# Patient Record
Sex: MALE | Race: BLACK OR AFRICAN AMERICAN | NOT HISPANIC OR LATINO | URBAN - METROPOLITAN AREA
[De-identification: names, ages, dates, MRNs, and addresses within clinical notes are randomized per-mention and may not be internally consistent; named-entity substitution may affect disease eponyms.]

---

## 2023-09-16 ENCOUNTER — HOSPITAL ENCOUNTER (EMERGENCY)
Facility: HOSPITAL | Age: 68
Discharge: HOME | End: 2023-09-16
Attending: STUDENT IN AN ORGANIZED HEALTH CARE EDUCATION/TRAINING PROGRAM
Payer: MEDICARE

## 2023-09-16 VITALS
HEART RATE: 74 BPM | DIASTOLIC BLOOD PRESSURE: 75 MMHG | BODY MASS INDEX: 23.7 KG/M2 | WEIGHT: 160 LBS | HEIGHT: 69 IN | OXYGEN SATURATION: 100 % | RESPIRATION RATE: 20 BRPM | TEMPERATURE: 99 F | SYSTOLIC BLOOD PRESSURE: 161 MMHG

## 2023-09-16 DIAGNOSIS — M54.50 CHRONIC BILATERAL LOW BACK PAIN, UNSPECIFIED WHETHER SCIATICA PRESENT: Primary | ICD-10-CM

## 2023-09-16 DIAGNOSIS — G89.29 CHRONIC BILATERAL LOW BACK PAIN, UNSPECIFIED WHETHER SCIATICA PRESENT: Primary | ICD-10-CM

## 2023-09-16 LAB
BILIRUB UR QL STRIP: NEGATIVE
CLARITY UR: CLEAR
COLOR UR: YELLOW
GLUCOSE UR QL STRIP: NEGATIVE
HGB UR QL STRIP: NEGATIVE
KETONES UR QL STRIP: NEGATIVE
LEUKOCYTE ESTERASE UR QL STRIP: NEGATIVE
NITRITE UR QL STRIP: NEGATIVE
PH UR STRIP: 5 [PH] (ref 5–8)
PROT UR QL STRIP: NEGATIVE
SP GR UR STRIP: 1.02 (ref 1–1.03)
URN SPEC COLLECT METH UR: NORMAL
UROBILINOGEN UR STRIP-ACNC: NEGATIVE EU/DL

## 2023-09-16 PROCEDURE — 96372 THER/PROPH/DIAG INJ SC/IM: CPT | Performed by: STUDENT IN AN ORGANIZED HEALTH CARE EDUCATION/TRAINING PROGRAM

## 2023-09-16 PROCEDURE — 99284 EMERGENCY DEPT VISIT MOD MDM: CPT

## 2023-09-16 PROCEDURE — 81003 URINALYSIS AUTO W/O SCOPE: CPT | Performed by: STUDENT IN AN ORGANIZED HEALTH CARE EDUCATION/TRAINING PROGRAM

## 2023-09-16 PROCEDURE — 63600175 PHARM REV CODE 636 W HCPCS: Performed by: STUDENT IN AN ORGANIZED HEALTH CARE EDUCATION/TRAINING PROGRAM

## 2023-09-16 PROCEDURE — 25000003 PHARM REV CODE 250: Performed by: STUDENT IN AN ORGANIZED HEALTH CARE EDUCATION/TRAINING PROGRAM

## 2023-09-16 RX ORDER — KETOROLAC TROMETHAMINE 30 MG/ML
30 INJECTION, SOLUTION INTRAMUSCULAR; INTRAVENOUS
Status: COMPLETED | OUTPATIENT
Start: 2023-09-16 | End: 2023-09-16

## 2023-09-16 RX ORDER — ACETAMINOPHEN 500 MG
500 TABLET ORAL EVERY 6 HOURS PRN
Qty: 20 TABLET | Refills: 0 | Status: SHIPPED | OUTPATIENT
Start: 2023-09-16

## 2023-09-16 RX ORDER — ORPHENADRINE CITRATE 30 MG/ML
30 INJECTION INTRAMUSCULAR; INTRAVENOUS
Status: COMPLETED | OUTPATIENT
Start: 2023-09-16 | End: 2023-09-16

## 2023-09-16 RX ORDER — OXYCODONE AND ACETAMINOPHEN 5; 325 MG/1; MG/1
1 TABLET ORAL
Status: COMPLETED | OUTPATIENT
Start: 2023-09-16 | End: 2023-09-16

## 2023-09-16 RX ORDER — METHOCARBAMOL 500 MG/1
1000 TABLET, FILM COATED ORAL 3 TIMES DAILY
Qty: 30 TABLET | Refills: 0 | Status: SHIPPED | OUTPATIENT
Start: 2023-09-16 | End: 2023-09-21

## 2023-09-16 RX ORDER — DEXAMETHASONE SODIUM PHOSPHATE 4 MG/ML
10 INJECTION, SOLUTION INTRA-ARTICULAR; INTRALESIONAL; INTRAMUSCULAR; INTRAVENOUS; SOFT TISSUE
Status: DISCONTINUED | OUTPATIENT
Start: 2023-09-16 | End: 2023-09-16

## 2023-09-16 RX ADMIN — DEXAMETHASONE 10 MG: 2 TABLET ORAL at 05:09

## 2023-09-16 RX ADMIN — OXYCODONE HYDROCHLORIDE AND ACETAMINOPHEN 1 TABLET: 5; 325 TABLET ORAL at 05:09

## 2023-09-16 RX ADMIN — KETOROLAC TROMETHAMINE 30 MG: 30 INJECTION, SOLUTION INTRAMUSCULAR; INTRAVENOUS at 05:09

## 2023-09-16 RX ADMIN — ORPHENADRINE CITRATE 30 MG: 60 INJECTION INTRAMUSCULAR; INTRAVENOUS at 05:09

## 2023-09-16 NOTE — ED TRIAGE NOTES
Pt presents to ED c/o back pain onset x 1 month.  Denies falls, trauma, heavy lifting, urinary symptoms or any other symptoms.  Pt reports last dosage of pain medication was yesterday around 1700.  Pain 10/10.

## 2023-09-16 NOTE — ED PROVIDER NOTES
Encounter Date: 9/16/2023       History     Chief Complaint   Patient presents with    Back Pain     X a while, X a month, seen at [facility] for same, has not followed up, has appt on Monday but could not stand the pain     68-year-old male history of alpha thalassemia, chronic kidney disease, erectile dysfunction, hypertension, lumbar disc disease with radiculopathy, osteopenia vitamin-D deficiency presents with lower back pain that has been ongoing for several months.  He reports he was evaluated in emergency department approximately 2 weeks ago at the time he reports he got shots of medications and a prescription.  He reports the shots helped but symptoms recurred.  He reports having follow-up with spinal surgeons on Monday but could not take the pain today so presented to the emergency department for evaluation.  He currently rates pain 10/10 in his worsened with movement.  He states years ago he was told he had kidney stones but this pain does not seem similar to it.  Denies any dysuria or hematuria.  Denies any fevers or chills.  Denies any urinary bowel incontinence, denies any saddle anesthesia.  Denies any abnormalities with gait.  Denies any weakness or numbness.  Denies any unexplained weight loss, denies any recent or remote trauma    The history is provided by the patient.     Review of patient's allergies indicates:  No Known Allergies  Past Medical History:   Diagnosis Date    Kidney stone      History reviewed. No pertinent surgical history.  Family History   Problem Relation Age of Onset    Hypertension Father     Sudden death Mother      Social History     Tobacco Use    Smoking status: Never   Substance Use Topics    Alcohol use: No    Drug use: No     Review of Systems   Constitutional:  Negative for chills and fever.   HENT:  Negative for congestion and rhinorrhea.    Eyes:  Negative for pain.   Respiratory:  Negative for cough and shortness of breath.    Cardiovascular:  Negative for chest pain and  leg swelling.   Gastrointestinal:  Negative for abdominal pain, nausea and vomiting.   Endocrine: Negative for polyuria.   Genitourinary:  Negative for dysuria and hematuria.   Musculoskeletal:  Positive for back pain. Negative for gait problem and neck pain.   Skin:  Negative for rash.   Allergic/Immunologic: Negative for immunocompromised state.   Neurological:  Negative for weakness and headaches.       Physical Exam     Initial Vitals [09/16/23 0430]   BP Pulse Resp Temp SpO2   (!) 185/83 70 16 98.3 °F (36.8 °C) 100 %      MAP       --         Physical Exam    Nursing note and vitals reviewed.  Constitutional: He is not diaphoretic. No distress.   HENT:   Head: Normocephalic and atraumatic.   Eyes: Conjunctivae and EOM are normal. Pupils are equal, round, and reactive to light.   Neck:   Normal range of motion.  Cardiovascular:  Regular rhythm.           Pulses:       Radial pulses are 2+ on the right side and 2+ on the left side.        Posterior tibial pulses are 2+ on the right side and 2+ on the left side.   Pulmonary/Chest: Breath sounds normal. No respiratory distress.   Abdominal: Abdomen is soft. Bowel sounds are normal. He exhibits no distension. There is no abdominal tenderness. There is no rebound and no guarding.   Musculoskeletal:         General: Normal range of motion.      Cervical back: Normal range of motion. No tenderness or bony tenderness.      Thoracic back: No tenderness or bony tenderness.      Lumbar back: Tenderness present. No bony tenderness. Negative right straight leg raise test and negative left straight leg raise test.     Lymphadenopathy:     He has no cervical adenopathy.   Neurological: He is alert and oriented to person, place, and time.   Skin: Skin is warm. Capillary refill takes less than 2 seconds.   Psychiatric: He has a normal mood and affect. His behavior is normal.         ED Course   Procedures  Labs Reviewed   URINALYSIS, REFLEX TO URINE CULTURE    Narrative:      Specimen Source->Urine          Imaging Results    None          Medications   oxyCODONE-acetaminophen 5-325 mg per tablet 1 tablet (1 tablet Oral Given 9/16/23 0512)   orphenadrine injection 30 mg (30 mg Intramuscular Given 9/16/23 0516)   ketorolac injection 30 mg (30 mg Intramuscular Given 9/16/23 0510)   dexAMETHasone tablet 10 mg (10 mg Oral Given 9/16/23 0523)     Medical Decision Making:   History:   Old Medical Records: I decided to obtain old medical records.  Initial Assessment:   68-year-old male history of alpha thalassemia, chronic kidney disease, erectile dysfunction, hypertension, lumbar disc disease with radiculopathy, osteopenia vitamin-D deficiency presents with lower back pain that has been ongoing for several months.  Patient in no acute distress vitals notable for elevated blood pressure otherwise unremarkable.  Distal pulses intact and symmetric.  Abdomen soft nontender.  Patient without any midline CT or L-spine tenderness.  No focal weakness in the lower extremities. Given exam and history, low suspicion for cord compression, cauda equina, epidural abscess/hematoma. Distally neurovascuarly intact. Query likely musculoskeletal component versus sciatica.  Given patient reported steroids help with his prior exacerbation of acute on chronic or back pain will give dexamethasone, Toradol, muscle relaxant and a dose of Percocet.  Discussed pain control, physical therapy and follow up with PMD. Cautious return precautions discussed w/ full understanding.  Reassuring that patient has follow-up with spine surgery in 2 days.      Differential Diagnosis:   Differential Diagnosis includes, but is not limited to:  Cauda equina syndrome, diskitis/osteomyelitis, epidural/paraspinal abscess, AAA, aortic dissection, post-op/hardware infection, trauma/vertebral fracture, spinal cord injury, disc herniation, spinal stenosis, sciatica, radiculopathy, neoplasm, lumbar muscle strain, muscle spasm, neuropathic pain,  UTI/pyelonephritis, nephrolithiasis.    Clinical Tests:   Lab Tests: Ordered and Reviewed               ED Course as of 09/16/23 0620   Sat Sep 16, 2023   0533 Urinalysis, Reflex to Urine Culture Urine, Clean Catch  UA without signs of infection.  Patient reports moderate improvement of back pain after medications.  Reassured that patient has close follow-up with spine surgery scheduled on Monday. [AS]      ED Course User Index  [AS] Tavo Moore MD               Pt is currently stable for discharge. I see no indication of an emergent process beyond that addressed during our encounter but have duly counseled the patient/family regarding the need for prompt follow-up as well as the indications that should prompt immediate return to the emergency room should new or worrisome developments occur. I discussed the ED work up and diagnostic findings with the patient/family. The patient/family has been provided with verbal and printed direction regarding our final diagnosis(es) as well as instructions regarding use of OTC and/or Rx medications intended to manage the patient's aforementioned conditions. The patient/family verbalized an understanding. The patient/family is asked if there are any questions or concerns. We discuss the case, until all issues are addressed to the patient/family's satisfaction. Patient/family understands and is agreeable to the plan.   Clinical Impression:   Final diagnoses:  [M54.50, G89.29] Chronic bilateral low back pain, unspecified whether sciatica present (Primary)          ED Disposition Condition    Discharge Stable          ED Prescriptions       Medication Sig Dispense Start Date End Date Auth. Provider    acetaminophen (TYLENOL) 500 MG tablet Take 1 tablet (500 mg total) by mouth every 6 (six) hours as needed for Pain. 20 tablet 9/16/2023 -- Tavo Moore MD    methocarbamoL (ROBAXIN) 500 MG Tab Take 2 tablets (1,000 mg total) by mouth 3 (three) times daily. for 5 days 30 tablet  9/16/2023 9/21/2023 Tavo Moore MD          Follow-up Information       Follow up With Specialties Details Why Contact Info    Arturo Brooks Jr., MD Nephrology Schedule an appointment as soon as possible for a visit  for reassesment [address] [place] [id number]  [phone]      Spinal surgery  Go on 9/18/2023 for reassesment             DISCLAIMER: This note was prepared with [employer] voice recognition transcription software. Garbled syntax, mangled pronouns, and other bizarre constructions may be attributed to that software system.     Tavo Moore MD  09/16/23 0620

## 2023-09-16 NOTE — DISCHARGE INSTRUCTIONS
Thank you for coming to our Emergency Department today. It is important to remember that some problems are difficult to diagnose and may not be found during your first visit. Be sure to follow up with your primary care doctor and review any labs/imaging that was performed with them. If you do not have a primary care doctor, you may contact the one listed on your discharge paperwork or you may also call the [facility] Appointment Desk at [phone] to schedule an appointment with one.     All medications may potentially have side effects and it is impossible to predict which medications may give you side effects. If you feel that you are having a negative effect of any medication you should immediately stop taking them and seek medical attention.    Return to the ER with any questions/concerns, new/concerning symptoms, worsening or failure to improve. Do not drive or make any important decisions for 24 hours if you have received any pain medications, sedatives or mood altering drugs during your ER visit.